# Patient Record
Sex: FEMALE | Race: WHITE | NOT HISPANIC OR LATINO | URBAN - METROPOLITAN AREA
[De-identification: names, ages, dates, MRNs, and addresses within clinical notes are randomized per-mention and may not be internally consistent; named-entity substitution may affect disease eponyms.]

---

## 2021-01-13 ENCOUNTER — PREPPED CHART (OUTPATIENT)
Dept: URBAN - METROPOLITAN AREA CLINIC 93 | Facility: CLINIC | Age: 71
End: 2021-01-13

## 2021-01-13 PROBLEM — H25.093 INCIPIENT SENILE CATARACT: Status: DETERIORATING | Noted: 2021-01-13 | Resolved: 2021-01-13

## 2021-01-13 PROBLEM — H25.093 INCIPIENT SENILE CATARACT: Noted: 2021-01-13 | Resolved: 2021-01-13

## 2021-01-13 PROBLEM — S00.12XA CONTUSION OF EYELIDS AND PERIORBITAL AREA: Noted: 2021-01-13

## 2021-01-13 PROBLEM — H52.4 PRESBYOPIA: Noted: 2021-01-13

## 2021-01-13 PROBLEM — H25.093 INCIPIENT SENILE CATARACT: Noted: 2021-01-13

## 2022-01-19 ENCOUNTER — APPOINTMENT (RX ONLY)
Dept: URBAN - METROPOLITAN AREA CLINIC 41 | Facility: CLINIC | Age: 72
Setting detail: DERMATOLOGY
End: 2022-01-19

## 2022-01-19 DIAGNOSIS — L57.8 OTHER SKIN CHANGES DUE TO CHRONIC EXPOSURE TO NONIONIZING RADIATION: ICD-10-CM

## 2022-01-19 DIAGNOSIS — L82.0 INFLAMED SEBORRHEIC KERATOSIS: ICD-10-CM

## 2022-01-19 PROCEDURE — ? ADDITIONAL NOTES

## 2022-01-19 PROCEDURE — ? TREATMENT REGIMEN

## 2022-01-19 PROCEDURE — ? COUNSELING

## 2022-01-19 PROCEDURE — ? LIQUID NITROGEN

## 2022-01-19 PROCEDURE — 17110 DESTRUCTION B9 LES UP TO 14: CPT

## 2022-01-19 PROCEDURE — 99203 OFFICE O/P NEW LOW 30 MIN: CPT | Mod: 25

## 2022-01-19 ASSESSMENT — LOCATION SIMPLE DESCRIPTION DERM
LOCATION SIMPLE: RIGHT FOREARM
LOCATION SIMPLE: RIGHT CHEEK
LOCATION SIMPLE: CHEST
LOCATION SIMPLE: LEFT CHEEK
LOCATION SIMPLE: LEFT FOREARM

## 2022-01-19 ASSESSMENT — LOCATION DETAILED DESCRIPTION DERM
LOCATION DETAILED: LEFT MEDIAL SUPERIOR CHEST
LOCATION DETAILED: LEFT PROXIMAL DORSAL FOREARM
LOCATION DETAILED: RIGHT INFERIOR CENTRAL MALAR CHEEK
LOCATION DETAILED: LEFT LATERAL SUPERIOR CHEST
LOCATION DETAILED: RIGHT PROXIMAL DORSAL FOREARM
LOCATION DETAILED: LEFT INFERIOR CENTRAL MALAR CHEEK

## 2022-01-19 ASSESSMENT — LOCATION ZONE DERM
LOCATION ZONE: FACE
LOCATION ZONE: ARM
LOCATION ZONE: TRUNK

## 2022-01-19 NOTE — HPI: DISCOLORATION (HYPERPIGMENTATION)
Is This A New Presentation, Or A Follow-Up?: Discoloration
Additional History: New pt.\\nWould like to treat dark spots on nose and cheeks, and arms. Pt says she had CO2 laser which worked well. \\nPt also asks about facial wrinkles around mouth. \\nCat scratch on neck in November, has been treating with mederma.

## 2022-01-19 NOTE — PROCEDURE: COUNSELING
Patient Specific Counseling (Will Not Stick From Patient To Patient): EG counsels on coolpeel - no down time. Cool peel helps with fine lines, texture, and wrinkles, and can also help with pigmentation. Can be done on face and extremities. 3-6 sessions. Pt has wedding in April. EG says we can do stronger treatment (BARRY) so results can be seen sooner, but this will still take 90 days to see result. \\n\\nPt does not have very dark spots, so EG recommends Coolpeel or BARRY for texture and brightening. BARRY would be stronger and pt would see results sooner. For arms, only coolpeel, not BARRY. \\n\\nEG notes this would be cosmetic. BARRY single session 3500, cool peel 1000/session per location (1000 for arms, 1000 for face). EG recommends 90 days between BARRY sessions, cool peel can be done more frequently. Pt given CO2 laser resurfacing handout. 30 min time slot.\\n\\nSend numbing cream if pt schedules BARRY.\\n\\nPt notes history of melanoma on the leg 15 years ago. Had been doing regular skin checks but has not in some time. EG notes importance of regular skin checks. Pt declines skin check today. EG encourages pt to schedule.
Detail Level: Detailed
Patient Specific Counseling (Will Not Stick From Patient To Patient): EG notes this is likely an SK, not a scratch since it is round in appearance.

## 2022-01-19 NOTE — PROCEDURE: LIQUID NITROGEN
Spray Paint Technique: No
Spray Paint Text: The liquid nitrogen was applied to the skin utilizing a spray paint frosting technique.
Consent: The patient's consent was obtained including but not limited to risks of crusting, scabbing, blistering, scarring, darker or lighter pigmentary change, recurrence, incomplete removal and infection.
Show Topical Anesthesia Variable?: Yes
Medical Necessity Clause: This procedure was medically necessary because the lesions that were treated were:
Post-Care Instructions: I reviewed with the patient in detail post-care instructions. Patient is to wear sunprotection, and avoid picking at any of the treated lesions. Pt may apply Vaseline to crusted or scabbing areas.
Medical Necessity Information: It is in your best interest to select a reason for this procedure from the list below. All of these items fulfill various CMS LCD requirements except the new and changing color options.
Detail Level: Detailed

## 2022-03-04 ENCOUNTER — ESTABLISHED COMPREHENSIVE EXAM (OUTPATIENT)
Dept: URBAN - METROPOLITAN AREA CLINIC 93 | Facility: CLINIC | Age: 72
End: 2022-03-04

## 2022-03-04 DIAGNOSIS — H25.093: ICD-10-CM

## 2022-03-04 DIAGNOSIS — H52.13: ICD-10-CM

## 2022-03-04 DIAGNOSIS — H52.4: ICD-10-CM

## 2022-03-04 PROCEDURE — 92015 DETERMINE REFRACTIVE STATE: CPT | Mod: GY

## 2022-03-04 PROCEDURE — 92014 COMPRE OPH EXAM EST PT 1/>: CPT

## 2022-03-04 ASSESSMENT — TONOMETRY
OD_IOP_MMHG: 18
OS_IOP_MMHG: 18

## 2022-03-04 ASSESSMENT — VISUAL ACUITY
OS_SC: 20/30
OD_SC: 20/25

## 2023-08-09 ENCOUNTER — ESTABLISHED COMPREHENSIVE EXAM (OUTPATIENT)
Dept: URBAN - METROPOLITAN AREA CLINIC 93 | Facility: CLINIC | Age: 73
End: 2023-08-09

## 2023-08-09 DIAGNOSIS — H52.13: ICD-10-CM

## 2023-08-09 DIAGNOSIS — H25.093: ICD-10-CM

## 2023-08-09 DIAGNOSIS — H52.4: ICD-10-CM

## 2023-08-09 DIAGNOSIS — H52.223: ICD-10-CM

## 2023-08-09 PROCEDURE — 92015 DETERMINE REFRACTIVE STATE: CPT | Mod: GY

## 2023-08-09 PROCEDURE — 92014 COMPRE OPH EXAM EST PT 1/>: CPT

## 2023-08-09 ASSESSMENT — VISUAL ACUITY
OS_SC: 20/30
OD_SC: 20/40

## 2023-08-09 ASSESSMENT — TONOMETRY
OD_IOP_MMHG: 18
OS_IOP_MMHG: 18

## 2024-09-16 ENCOUNTER — ESTABLISHED COMPREHENSIVE EXAM (OUTPATIENT)
Dept: URBAN - METROPOLITAN AREA CLINIC 93 | Facility: CLINIC | Age: 74
End: 2024-09-16

## 2024-09-16 DIAGNOSIS — H52.13: ICD-10-CM

## 2024-09-16 DIAGNOSIS — H04.123: ICD-10-CM

## 2024-09-16 DIAGNOSIS — H25.093: ICD-10-CM

## 2024-09-16 DIAGNOSIS — H52.223: ICD-10-CM

## 2024-09-16 DIAGNOSIS — H52.4: ICD-10-CM

## 2024-09-16 PROCEDURE — 92014 COMPRE OPH EXAM EST PT 1/>: CPT

## 2024-09-16 PROCEDURE — 92015 DETERMINE REFRACTIVE STATE: CPT | Mod: GY

## 2024-09-16 ASSESSMENT — TONOMETRY
OD_IOP_MMHG: 18
OS_IOP_MMHG: 18

## 2024-09-16 ASSESSMENT — VISUAL ACUITY
OD_CC: 20/30
OS_CC: 20/20-2

## 2025-09-03 ENCOUNTER — ESTABLISHED COMPREHENSIVE EXAM (OUTPATIENT)
Dept: URBAN - METROPOLITAN AREA CLINIC 93 | Facility: CLINIC | Age: 75
End: 2025-09-03

## 2025-09-03 DIAGNOSIS — H52.4: ICD-10-CM

## 2025-09-03 DIAGNOSIS — H25.093: ICD-10-CM

## 2025-09-03 DIAGNOSIS — H04.123: ICD-10-CM

## 2025-09-03 DIAGNOSIS — H52.13: ICD-10-CM

## 2025-09-03 DIAGNOSIS — H52.223: ICD-10-CM

## 2025-09-03 PROCEDURE — 92015 DETERMINE REFRACTIVE STATE: CPT | Mod: GY

## 2025-09-03 PROCEDURE — 92014 COMPRE OPH EXAM EST PT 1/>: CPT

## 2025-09-03 ASSESSMENT — VISUAL ACUITY
OD_CC: 20/30+2
OS_CC: 20/30

## 2025-09-03 ASSESSMENT — TONOMETRY
OD_IOP_MMHG: 19
OS_IOP_MMHG: 18